# Patient Record
Sex: FEMALE | Race: BLACK OR AFRICAN AMERICAN | NOT HISPANIC OR LATINO | Employment: STUDENT | URBAN - METROPOLITAN AREA
[De-identification: names, ages, dates, MRNs, and addresses within clinical notes are randomized per-mention and may not be internally consistent; named-entity substitution may affect disease eponyms.]

---
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
## 2024-02-03 ENCOUNTER — OFFICE VISIT (OUTPATIENT)
Dept: URGENT CARE | Facility: CLINIC | Age: 19
End: 2024-02-03
Payer: MEDICAID

## 2024-02-03 VITALS
SYSTOLIC BLOOD PRESSURE: 110 MMHG | BODY MASS INDEX: 21.35 KG/M2 | TEMPERATURE: 98 F | HEART RATE: 66 BPM | HEIGHT: 67 IN | DIASTOLIC BLOOD PRESSURE: 71 MMHG | RESPIRATION RATE: 17 BRPM | OXYGEN SATURATION: 98 % | WEIGHT: 136 LBS

## 2024-02-03 DIAGNOSIS — R10.32 LEFT LOWER QUADRANT ABDOMINAL PAIN: ICD-10-CM

## 2024-02-03 DIAGNOSIS — M54.50 MIDLINE LOW BACK PAIN WITHOUT SCIATICA, UNSPECIFIED CHRONICITY: ICD-10-CM

## 2024-02-03 DIAGNOSIS — N89.8 VAGINAL DISCHARGE: Primary | ICD-10-CM

## 2024-02-03 LAB
B-HCG UR QL: NEGATIVE
BILIRUB UR QL STRIP: NEGATIVE
CTP QC/QA: YES
GLUCOSE UR QL STRIP: NEGATIVE
KETONES UR QL STRIP: NEGATIVE
LEUKOCYTE ESTERASE UR QL STRIP: NEGATIVE
PH, POC UA: 5
POC BLOOD, URINE: NEGATIVE
POC NITRATES, URINE: NEGATIVE
PROT UR QL STRIP: NEGATIVE
SP GR UR STRIP: 1.02 (ref 1–1.03)
UROBILINOGEN UR STRIP-ACNC: NORMAL (ref 0.1–1.1)

## 2024-02-03 PROCEDURE — 81025 URINE PREGNANCY TEST: CPT | Mod: S$GLB,,,

## 2024-02-03 PROCEDURE — 81514 NFCT DS BV&VAGINITIS DNA ALG: CPT

## 2024-02-03 PROCEDURE — 87491 CHLMYD TRACH DNA AMP PROBE: CPT

## 2024-02-03 PROCEDURE — 81003 URINALYSIS AUTO W/O SCOPE: CPT | Mod: QW,S$GLB,,

## 2024-02-03 PROCEDURE — 99214 OFFICE O/P EST MOD 30 MIN: CPT | Mod: S$GLB,,,

## 2024-02-03 RX ORDER — FLUCONAZOLE 150 MG/1
150 TABLET ORAL DAILY
Qty: 2 TABLET | Refills: 0 | Status: SHIPPED | OUTPATIENT
Start: 2024-02-03 | End: 2024-02-05

## 2024-02-03 RX ORDER — IBUPROFEN 800 MG/1
800 TABLET ORAL EVERY 8 HOURS PRN
Qty: 21 TABLET | Refills: 0 | Status: SHIPPED | OUTPATIENT
Start: 2024-02-03

## 2024-02-03 RX ORDER — DICYCLOMINE HYDROCHLORIDE 20 MG/1
20 TABLET ORAL EVERY 6 HOURS PRN
Qty: 30 TABLET | Refills: 0 | Status: SHIPPED | OUTPATIENT
Start: 2024-02-03

## 2024-02-03 NOTE — PROGRESS NOTES
"Subjective:      Patient ID: Venancio Reynolds is a 18 y.o. female.    Vitals:  height is 5' 7" (1.702 m) and weight is 61.7 kg (136 lb 0.4 oz). Her oral temperature is 98.4 °F (36.9 °C). Her blood pressure is 110/71 and her pulse is 66. Her respiration is 17 and oxygen saturation is 98%.     Chief Complaint: Vaginal Discharge    Patient states that she would like to be tested for STIs. She states that she has a history of recurrent yeast infection. She states that she is having vaginal discharge. She described the discharge as white, thick, clumpy. She states that she is a sexually active. Onset of symptoms was 2 days ago. She denies fever, chills, CP, SOB, nausea, vomiting, abdominal pain.    Patient states that she has also has back pain and left sided abdominal pain since december 2023. She states that she has a history of scoliosis but denies recent trauma or injury to the back to cause her symptoms. She is able to tolerate food and beverage, She has not tried anything for her back pain or abdominal pain. Associated symptoms include numbness/tingling into her left thigh.    Vaginal Discharge  The patient's primary symptoms include vaginal discharge. The patient's pertinent negatives include no genital itching, genital lesions, genital odor, genital rash, missed menses, pelvic pain or vaginal bleeding. This is a new problem. The current episode started in the past 7 days (2 days ago). The problem has been unchanged. The pain is moderate. She is not pregnant. Associated symptoms include abdominal pain (left lower for a month) and back pain (over a month ago). Pertinent negatives include no anorexia, chills, constipation, diarrhea, discolored urine, dysuria, fever, flank pain, frequency, headaches, hematuria, joint pain, joint swelling, nausea, painful intercourse, rash, sore throat, urgency or vomiting. The vaginal discharge was thick and white. There has been no bleeding. She has not been passing clots. She has " not been passing tissue. Treatments tried: boric acid suppositories. The treatment provided moderate relief. She is sexually active. No, her partner does not have an STD. She uses nothing for contraception. Her menstrual history has been regular. There is no history of an abdominal surgery, a  section, an ectopic pregnancy, endometriosis, a gynecological surgery, herpes simplex, menorrhagia, metrorrhagia, miscarriage, ovarian cysts, perineal abscess, PID, an STD, a terminated pregnancy or vaginosis.       Constitution: Negative for chills and fever.   HENT:  Negative for sore throat.    Cardiovascular:  Negative for chest pain and sob on exertion.   Respiratory:  Negative for shortness of breath.    Gastrointestinal:  Positive for abdominal pain (left lower for a month). Negative for history of abdominal surgery, nausea, vomiting, constipation and diarrhea.   Genitourinary:  Positive for vaginal discharge. Negative for dysuria, frequency, urgency, flank pain, hematuria, missed menses, ovarian cysts, vaginal pain, vaginal bleeding, vaginal odor, painful intercourse, genital sore and pelvic pain.   Musculoskeletal:  Positive for back pain (over a month ago).   Skin:  Negative for rash.   Neurological:  Negative for headaches.      Objective:     Physical Exam   Constitutional:  Non-toxic appearance. She does not appear ill. No distress.      Comments:Patient is in no acute distress, patient is non-toxic appearing, patient is ox3, patient is answering question appropriately.   normal  Cardiovascular: Normal rate, regular rhythm and normal heart sounds.   No murmur heard.Exam reveals no gallop and no friction rub.   Pulmonary/Chest: Effort normal and breath sounds normal. No stridor. No respiratory distress. She has no wheezes. She has no rhonchi. She has no rales. She exhibits no tenderness.         Comments: Patient is in no respiratory distress. Breath sounds even, unlabored, and clear to auscultation  bilaterally. No accessory muscle usage. Patient able to speak in complete sentences with ease.    Abdominal: Normal appearance and bowel sounds are normal. She exhibits no distension and no mass. Soft. There is no abdominal tenderness. There is no rebound, no guarding, no left CVA tenderness and no right CVA tenderness. No hernia.   Musculoskeletal:      Thoracic back: Normal.      Lumbar back: She exhibits tenderness. She exhibits normal range of motion, no bony tenderness, no swelling, no edema, no deformity, no laceration and no spasm.        Back:       Comments: Purple represents area of involvement. 5/5 strength of LE. Full ROM, active and passive. Sensation intact. Patellar DTR , 2+ bilaterally. There is L4-L5 midline/paraspinal tenderness. No step off deformities. Patient is able to ambulate in exam room and throughout clinic.   Neurological: She is alert.   Skin: Skin is not diaphoretic.   Nursing note and vitals reviewed.    Results for orders placed or performed in visit on 02/03/24   POCT Urinalysis, Dipstick, Automated, W/O Scope   Result Value Ref Range    POC Blood, Urine Negative Negative    POC Bilirubin, Urine Negative Negative    POC Urobilinogen, Urine normal 0.1 - 1.1    POC Ketones, Urine Negative Negative    POC Protein, Urine Negative Negative    POC Nitrates, Urine Negative Negative    POC Glucose, Urine Negative Negative    pH, UA 5.0     POC Specific Gravity, Urine 1.025 1.003 - 1.029    POC Leukocytes, Urine Negative Negative   POCT urine pregnancy   Result Value Ref Range    POC Preg Test, Ur Negative Negative     Acceptable Yes      Assessment:     1. Vaginal discharge    2. Midline low back pain without sciatica, unspecified chronicity    3. Left lower quadrant abdominal pain      Plan:   Previous notes reviewed.  Vital signs reviewed.  Labs ordered. Labs reviewed.  Discussed vaginal discharge due to vaginal yeast infection, home care, tx options, and given follow up  precautions.  Patient was briefed on my thought process and diagnosis.   Patient involved with the treatment plan and agreed to the plan.    Presents with vaginal discharge, patient is stable and nontoxic appearing, will treat for suspected yeast infection at this, physical exam is without acute/red flag indications at this time, patient is neurovascularly intact, there is no tenderness/ rebound/ guarding on abdominal exam, no CVA tenderness, back pain is due to musculoskeletal causes, will continue to treat symptomatically, pcp follow up encouraged, strict ER precautions given.    Patient informed on warning signs, patient understood warning signs and to go to urgent care or ER if warning signs appear.  Please excuse grammatical/spelling errors appreciated throughout this visit encounter for a remote dictation device was used during this encounter.    Patient Instructions   Please return here or go to the Emergency Department for any concerns or worsening of condition.    Please take DIFLUCAN for a yeast infection that may develop after taking antibiotics due to your history of yeast infections after antibiotics. IF you do take this medication please take ONE tablet when you develop signs of a yeast infection and take the ONE tablet 3-4 days after your first tablet IF you symptoms persist.    Please take DICYCLOMINE for abdominal cramps.    Please take IBUPROFEN every 8 hours as needed for pain/inflammation, you may decrease to every 12 hour, or once daily, or discontinue as your symptoms improve.     Someone will contact you with your results from your vaginal swab and any potential changes to your treatment plan.    Please follow up with your primary care doctor or specialist as needed.    If you  smoke, please stop smoking.    Vaginal discharge  -     VAGINOSIS SCREEN BY DNA PROBE  -     C. trachomatis/N. gonorrhoeae by AMP DNA [facility]; Vagina  -     fluconazole (DIFLUCAN) 150 MG Tab; Take 1 tablet (150 mg  total) by mouth once daily. for 2 doses  Dispense: 2 tablet; Refill: 0    Midline low back pain without sciatica, unspecified chronicity  -     POCT Urinalysis, Dipstick, Automated, W/O Scope  -     ibuprofen (ADVIL,MOTRIN) 800 MG tablet; Take 1 tablet (800 mg total) by mouth every 8 (eight) hours as needed for Pain.  Dispense: 21 tablet; Refill: 0    Left lower quadrant abdominal pain  -     dicyclomine (BENTYL) 20 mg tablet; Take 1 tablet (20 mg total) by mouth every 6 (six) hours as needed (abdominal cramps/aches).  Dispense: 30 tablet; Refill: 0  -     POCT urine pregnancy       Additional MDM:   Differential Diagnosis:   UTI, sti, pyelonephritis, ectopic pregnancy, vaginitis, ovarian torsion, ovarian cyst, sciatica, acute abdomen       Cheryl Gutierrez PA-C

## 2024-02-03 NOTE — PATIENT INSTRUCTIONS
Please return here or go to the Emergency Department for any concerns or worsening of condition.    Please take DIFLUCAN for a yeast infection that may develop after taking antibiotics due to your history of yeast infections after antibiotics. IF you do take this medication please take ONE tablet when you develop signs of a yeast infection and take the ONE tablet 3-4 days after your first tablet IF you symptoms persist.    Please take DICYCLOMINE for abdominal cramps.    Please take IBUPROFEN every 8 hours as needed for pain/inflammation, you may decrease to every 12 hour, or once daily, or discontinue as your symptoms improve.     Someone will contact you with your results from your vaginal swab and any potential changes to your treatment plan.    Please follow up with your primary care doctor or specialist as needed.    If you  smoke, please stop smoking.

## 2024-02-06 LAB
C TRACH DNA SPEC QL NAA+PROBE: NOT DETECTED
N GONORRHOEA DNA SPEC QL NAA+PROBE: NOT DETECTED

## 2024-02-07 LAB
BACTERIAL VAGINOSIS DNA: NORMAL
CANDIDA GLABRATA DNA: NORMAL
CANDIDA KRUSEI DNA: NORMAL
CANDIDA RRNA VAG QL PROBE: NORMAL
T VAGINALIS RRNA GENITAL QL PROBE: NORMAL

## 2024-02-08 ENCOUNTER — TELEPHONE (OUTPATIENT)
Dept: URGENT CARE | Facility: CLINIC | Age: 19
End: 2024-02-08
Payer: MEDICAID

## 2024-02-09 NOTE — TELEPHONE ENCOUNTER
Patient contacted.   Patient informed on results.  All questions answered on this encounter.  Patient states that her symptoms have resolved.  Patient encouraged to follow up with pcp/OBGYN for repeat swab since symptoms have resolved.

## 2024-04-09 ENCOUNTER — OFFICE VISIT (OUTPATIENT)
Dept: URGENT CARE | Facility: CLINIC | Age: 19
End: 2024-04-09
Payer: MEDICAID

## 2024-04-09 VITALS
HEART RATE: 71 BPM | WEIGHT: 136 LBS | TEMPERATURE: 98 F | RESPIRATION RATE: 16 BRPM | OXYGEN SATURATION: 100 % | SYSTOLIC BLOOD PRESSURE: 130 MMHG | BODY MASS INDEX: 21.35 KG/M2 | DIASTOLIC BLOOD PRESSURE: 84 MMHG | HEIGHT: 67 IN

## 2024-04-09 DIAGNOSIS — Z11.3 SCREENING EXAMINATION FOR STD (SEXUALLY TRANSMITTED DISEASE): ICD-10-CM

## 2024-04-09 DIAGNOSIS — N76.0 BV (BACTERIAL VAGINOSIS): ICD-10-CM

## 2024-04-09 DIAGNOSIS — B96.89 BV (BACTERIAL VAGINOSIS): ICD-10-CM

## 2024-04-09 DIAGNOSIS — N89.8 VAGINAL DISCHARGE: Primary | ICD-10-CM

## 2024-04-09 LAB
B-HCG UR QL: NEGATIVE
BILIRUB UR QL STRIP: NEGATIVE
CTP QC/QA: YES
GLUCOSE UR QL STRIP: NEGATIVE
HCV AB SERPL QL IA: NORMAL
HIV 1+2 AB+HIV1 P24 AG SERPL QL IA: NORMAL
KETONES UR QL STRIP: NEGATIVE
LEUKOCYTE ESTERASE UR QL STRIP: NEGATIVE
PH, POC UA: 6.5 (ref 5–8)
POC BLOOD, URINE: NEGATIVE
POC NITRATES, URINE: NEGATIVE
PROT UR QL STRIP: NEGATIVE
SP GR UR STRIP: 1.02 (ref 1–1.03)
TREPONEMA PALLIDUM IGG+IGM AB [PRESENCE] IN SERUM OR PLASMA BY IMMUNOASSAY: NONREACTIVE
UROBILINOGEN UR STRIP-ACNC: NORMAL (ref 0.1–1.1)

## 2024-04-09 PROCEDURE — 81025 URINE PREGNANCY TEST: CPT | Mod: S$GLB,,, | Performed by: INTERNAL MEDICINE

## 2024-04-09 PROCEDURE — 87491 CHLMYD TRACH DNA AMP PROBE: CPT | Performed by: INTERNAL MEDICINE

## 2024-04-09 PROCEDURE — 99499 UNLISTED E&M SERVICE: CPT | Mod: S$GLB,,, | Performed by: INTERNAL MEDICINE

## 2024-04-09 PROCEDURE — 81003 URINALYSIS AUTO W/O SCOPE: CPT | Mod: QW,S$GLB,, | Performed by: INTERNAL MEDICINE

## 2024-04-09 PROCEDURE — 86593 SYPHILIS TEST NON-TREP QUANT: CPT | Performed by: INTERNAL MEDICINE

## 2024-04-09 PROCEDURE — 87389 HIV-1 AG W/HIV-1&-2 AB AG IA: CPT | Performed by: INTERNAL MEDICINE

## 2024-04-09 PROCEDURE — 36415 COLL VENOUS BLD VENIPUNCTURE: CPT | Performed by: INTERNAL MEDICINE

## 2024-04-09 PROCEDURE — 86803 HEPATITIS C AB TEST: CPT | Performed by: INTERNAL MEDICINE

## 2024-04-09 PROCEDURE — 81514 NFCT DS BV&VAGINITIS DNA ALG: CPT | Performed by: INTERNAL MEDICINE

## 2024-04-09 RX ORDER — METRONIDAZOLE 500 MG/1
500 TABLET ORAL EVERY 12 HOURS
Qty: 14 TABLET | Refills: 0 | Status: SHIPPED | OUTPATIENT
Start: 2024-04-09 | End: 2024-04-16

## 2024-04-09 NOTE — PROGRESS NOTES
"Subjective:      Patient ID: Venancio Reynolds is a 18 y.o. female.    Vitals:  height is 5' 7" (1.702 m) and weight is 61.7 kg (136 lb). Her temperature is 98.2 °F (36.8 °C). Her blood pressure is 130/84 and her pulse is 71. Her respiration is 16 and oxygen saturation is 100%.     Chief Complaint: Vaginal Discharge (Student )    Vaginal Discharge  The patient's primary symptoms include a genital odor and vaginal discharge. The patient's pertinent negatives include no genital itching, genital lesions, genital rash, missed menses, pelvic pain or vaginal bleeding. Primary symptoms comment: pt thinks she has BV. This is a new problem. The current episode started in the past 7 days. The problem occurs constantly. The problem has been unchanged. The patient is experiencing no pain. The problem affects both sides. She is not pregnant. Pertinent negatives include no abdominal pain, anorexia, back pain, chills, constipation, diarrhea, discolored urine, frequency, headaches, hematuria, joint pain, painful intercourse, rash, sore throat, urgency or vomiting. The vaginal discharge was milky. There has been no bleeding. She has not been passing clots. She has not been passing tissue. Nothing aggravates the symptoms. She has tried nothing for the symptoms. Her menstrual history has been irregular. There is no history of endometriosis, a gynecological surgery, menorrhagia or metrorrhagia.       Constitution: Negative for chills.   HENT:  Negative for sore throat.    Gastrointestinal:  Negative for abdominal pain, vomiting, constipation and diarrhea.   Genitourinary:  Positive for vaginal discharge. Negative for frequency, urgency, hematuria, missed menses and pelvic pain.   Musculoskeletal:  Negative for back pain.   Skin:  Negative for rash.   Neurological:  Negative for headaches.      Objective:     Physical Exam   Constitutional: She is oriented to person, place, and time.  Non-toxic appearance. She does not appear ill. No " distress.   HENT:   Head: Normocephalic and atraumatic.   Ears:   Right Ear: External ear normal.   Left Ear: External ear normal.   Nose: Nose normal. No congestion.   Pulmonary/Chest: No respiratory distress.   Abdominal: Normal appearance.   Neurological: She is alert and oriented to person, place, and time.   Skin: Skin is warm, dry and not diaphoretic.   Psychiatric: Her behavior is normal. Mood, judgment and thought content normal.   Vitals reviewed.      Assessment:     1. Vaginal discharge    2. BV (bacterial vaginosis)    3. Screening examination for STD (sexually transmitted disease)        Plan:       Vaginal discharge  -     POCT Urinalysis, Dipstick, Automated, W/O Scope  -     POCT urine pregnancy  -     Vaginosis Screen by DNA Probe    BV (bacterial vaginosis)  -     Vaginosis Screen by DNA Probe  -     metroNIDAZOLE (FLAGYL) 500 MG tablet; Take 1 tablet (500 mg total) by mouth every 12 (twelve) hours. for 7 days  Dispense: 14 tablet; Refill: 0    Screening examination for STD (sexually transmitted disease)  -     Treponema Pallidium Antibodies IgG, IgM  -     Hepatitis C Antibody  -     HIV 1/2 Ag/Ab (4th Gen)  -     C. trachomatis/N. gonorrhoeae by AMP DNA [facility]; Vagina  -     Vaginosis Screen by DNA Probe

## 2024-04-10 LAB
BACTERIAL VAGINOSIS DNA: POSITIVE
C TRACH DNA SPEC QL NAA+PROBE: NOT DETECTED
CANDIDA GLABRATA DNA: NEGATIVE
CANDIDA KRUSEI DNA: NEGATIVE
CANDIDA RRNA VAG QL PROBE: POSITIVE
N GONORRHOEA DNA SPEC QL NAA+PROBE: NOT DETECTED
T VAGINALIS RRNA GENITAL QL PROBE: NEGATIVE

## 2024-04-12 ENCOUNTER — TELEPHONE (OUTPATIENT)
Dept: URGENT CARE | Facility: CLINIC | Age: 19
End: 2024-04-12
Payer: MEDICAID

## 2024-04-12 DIAGNOSIS — B37.9 YEAST INFECTION: Primary | ICD-10-CM

## 2024-04-12 RX ORDER — FLUCONAZOLE 150 MG/1
150 TABLET ORAL
Qty: 2 TABLET | Refills: 0 | Status: SHIPPED | OUTPATIENT
Start: 2024-04-12 | End: 2024-04-18

## 2024-04-12 NOTE — TELEPHONE ENCOUNTER
Discussed with patient + results for BV and yeast.   She was treated for BV and needs treatment for yeast. All other tests negative.   All questions answered throughout call.